# Patient Record
Sex: FEMALE | Race: OTHER | NOT HISPANIC OR LATINO
[De-identification: names, ages, dates, MRNs, and addresses within clinical notes are randomized per-mention and may not be internally consistent; named-entity substitution may affect disease eponyms.]

---

## 2018-07-27 ENCOUNTER — TRANSCRIPTION ENCOUNTER (OUTPATIENT)
Age: 73
End: 2018-07-27

## 2018-07-27 ENCOUNTER — EMERGENCY (EMERGENCY)
Facility: HOSPITAL | Age: 73
LOS: 1 days | Discharge: HOME | End: 2018-07-27
Admitting: EMERGENCY MEDICINE
Payer: MEDICARE

## 2018-07-27 VITALS
WEIGHT: 130.07 LBS | HEIGHT: 64 IN | TEMPERATURE: 98 F | HEART RATE: 77 BPM | SYSTOLIC BLOOD PRESSURE: 141 MMHG | DIASTOLIC BLOOD PRESSURE: 63 MMHG | RESPIRATION RATE: 18 BRPM | OXYGEN SATURATION: 97 %

## 2018-07-27 PROCEDURE — 99283 EMERGENCY DEPT VISIT LOW MDM: CPT

## 2018-07-27 RX ORDER — POLYMYXIN B SULF/TRIMETHOPRIM 10000-1/ML
1 DROPS OPHTHALMIC (EYE)
Qty: 10 | Refills: 0 | OUTPATIENT
Start: 2018-07-27 | End: 2018-08-02

## 2018-07-27 NOTE — ED PROVIDER NOTE - MEDICAL DECISION MAKING DETAILS
74 y/o f right eye itching, discharge x 2 weeks; no abrasion/ulcer, visual acuity intact, IOP normal, pt well appearing, will treat for conjunctivitis with polytrim drops, f/u ophthalmology, return to ED if sx worsen.

## 2018-07-27 NOTE — ED PROVIDER NOTE - OBJECTIVE STATEMENT
A 73 year old female w/ PMH of asthma, glaucoma, cataracts and vision loss to the left eye, presents with eye redness, discharge, itching and pain to the right eye x 2 weeks.  Patient has been trying OTC saline eye drops without relief of pain.  The patient was seen at urgent care today and sent to the ED immediately.  Patient reports occasional blurred vision and reports she has been experienced intermittent sharp pain to the right eye.  Denies vision loss or diplopia.  Denies FB sensation.  Patient expresses concern, as she does not want to loss the sight in her right eye as well.

## 2018-07-27 NOTE — ED PROVIDER NOTE - EYES, MLM
Normal lids without swelling or edema.  Crusting discharge to b/l medial canthus.  Right conjunctival injection, hazing over the cornea consistent with cataracts.  No fluorescein uptake.  20/40 OD.  IOP 9mmHg x 3

## 2018-07-27 NOTE — ED ADULT NURSE NOTE - OBJECTIVE STATEMENT
Redness, tearing and pain w/ blurred vision to bilateral eyes.  Sent by  r/o infection.  No fevers or headache.

## 2018-07-30 PROBLEM — Z00.00 ENCOUNTER FOR PREVENTIVE HEALTH EXAMINATION: Status: ACTIVE | Noted: 2018-07-30

## 2018-07-31 DIAGNOSIS — Z79.899 OTHER LONG TERM (CURRENT) DRUG THERAPY: ICD-10-CM

## 2018-07-31 DIAGNOSIS — H10.9 UNSPECIFIED CONJUNCTIVITIS: ICD-10-CM

## 2018-07-31 DIAGNOSIS — H57.11 OCULAR PAIN, RIGHT EYE: ICD-10-CM

## 2018-07-31 DIAGNOSIS — J45.909 UNSPECIFIED ASTHMA, UNCOMPLICATED: ICD-10-CM

## 2018-08-02 ENCOUNTER — APPOINTMENT (OUTPATIENT)
Dept: OPHTHALMOLOGY | Facility: CLINIC | Age: 73
End: 2018-08-02
Payer: MEDICARE

## 2018-08-02 DIAGNOSIS — H40.001 PREGLAUCOMA, UNSPECIFIED, RIGHT EYE: ICD-10-CM

## 2018-08-02 DIAGNOSIS — H44.522 ATROPHY OF GLOBE, LEFT EYE: ICD-10-CM

## 2018-08-02 PROCEDURE — 92133 CPTRZD OPH DX IMG PST SGM ON: CPT

## 2018-08-02 PROCEDURE — 92083 EXTENDED VISUAL FIELD XM: CPT

## 2018-08-02 PROCEDURE — 76512 OPH US DX B-SCAN: CPT | Mod: LT

## 2018-08-02 PROCEDURE — 92020 GONIOSCOPY: CPT

## 2018-08-02 PROCEDURE — 76514 ECHO EXAM OF EYE THICKNESS: CPT

## 2018-08-02 PROCEDURE — 92004 COMPRE OPH EXAM NEW PT 1/>: CPT

## 2019-02-07 ENCOUNTER — APPOINTMENT (OUTPATIENT)
Dept: OPHTHALMOLOGY | Facility: CLINIC | Age: 74
End: 2019-02-07

## 2020-01-22 NOTE — ED ADULT NURSE NOTE - CHIEF COMPLAINT
The patient is a 73y Female complaining of pain, eye. This document is complete and the patient is ready for discharge.

## 2022-07-19 PROBLEM — H40.001 GLAUCOMA SUSPECT OF RIGHT EYE: Status: ACTIVE | Noted: 2018-08-02
